# Patient Record
Sex: MALE | Race: WHITE | Employment: OTHER | URBAN - METROPOLITAN AREA
[De-identification: names, ages, dates, MRNs, and addresses within clinical notes are randomized per-mention and may not be internally consistent; named-entity substitution may affect disease eponyms.]

---

## 2019-11-25 ENCOUNTER — HOSPITAL ENCOUNTER (EMERGENCY)
Facility: CLINIC | Age: 29
Discharge: HOME | End: 2019-11-25
Attending: PHYSICIAN ASSISTANT | Admitting: PHYSICIAN ASSISTANT

## 2019-11-25 VITALS
OXYGEN SATURATION: 100 % | SYSTOLIC BLOOD PRESSURE: 150 MMHG | DIASTOLIC BLOOD PRESSURE: 97 MMHG | RESPIRATION RATE: 16 BRPM | HEART RATE: 89 BPM | TEMPERATURE: 98.6 F

## 2019-11-25 DIAGNOSIS — S05.01XA ABRASION OF RIGHT CORNEA, INITIAL ENCOUNTER: ICD-10-CM

## 2019-11-25 PROCEDURE — 99284 EMERGENCY DEPT VISIT MOD MDM: CPT

## 2019-11-25 RX ORDER — ERYTHROMYCIN 5 MG/G
0.5 OINTMENT OPHTHALMIC 4 TIMES DAILY
Qty: 3.5 G | Refills: 0 | Status: SHIPPED | OUTPATIENT
Start: 2019-11-25 | End: 2019-12-02

## 2019-11-25 RX ORDER — KETOROLAC TROMETHAMINE 5 MG/ML
1 SOLUTION OPHTHALMIC 4 TIMES DAILY
Qty: 5 ML | Refills: 0 | Status: SHIPPED | OUTPATIENT
Start: 2019-11-25

## 2019-11-25 ASSESSMENT — ENCOUNTER SYMPTOMS
EYE PAIN: 1
EYE DISCHARGE: 1

## 2019-11-25 NOTE — ED AVS SNAPSHOT
[facility] Emergency Department  [address]  [facility] [address]  Phone:  [phone]  Fax:  [phone]                                    Davon Lackey   MRN: [id number]    Department:  [facility] Emergency Department   Date of Visit:  11/25/2019           After Visit Summary Signature Page    I have received my discharge instructions, and my questions have been answered. I have discussed any challenges I see with this plan with the nurse or doctor.    ..........................................................................................................................................  Patient/Patient Representative Signature      ..........................................................................................................................................  Patient Representative Print Name and Relationship to Patient    ..................................................               ................................................  Date                                   Time    ..........................................................................................................................................  Reviewed by Signature/Title    ...................................................              ..............................................  Date                                               Time          22EPIC Rev 08/18

## 2019-11-26 NOTE — ED TRIAGE NOTES
Pt in with C/O fob to the R eye. Pt reports he was cutting plywood at work approximatly 6 hrs ago and still feels as if there is something in the eye. Redness present on exam. Pt A&Ox4 ABCD's intact

## 2019-11-26 NOTE — ED PROVIDER NOTES
History     Chief Complaint:  Eye pain    HPI   Davon Lackey is a 29 year old male who presents with his mother to the emergency department for evaluation of right eye pain. The patient reports he was cutting plywood this afternoon, while wearing safety glasses, when a valery of wind blew particles into his right eye. He notes most of the particles came out of his eye but he still felt irritation for most of the day. He notes his eye pain worsened around 1700 and he has been experiencing discharge since the incident this afternoon. He denies any vision changes or past eye injuries. He notes his tetanus is up to date, but last recorded tetanus in [facility] is 1997. Patient does not wear contacts.     Allergies:  No known drug allergies     Medications:    The patient is not currently taking any prescribed medications.    Past Medical History:    The patient does not have any past pertinent medical history.    Past Surgical History:    History reviewed. No pertinent surgical history.    Family History:    History reviewed. No pertinent family history.     Social History:  The patient presents to the emergency department with his mother.    Review of Systems   Eyes: Positive for pain and discharge. Negative for visual disturbance.   All other systems reviewed and are negative.    Physical Exam   Patient Vitals for the past 24 hrs:   BP Temp Temp src Pulse Resp SpO2   11/25/19 2051 (!) 150/97 98.6  F (37  C) Temporal 89 16 100 %     Physical Exam  General: Alert and interactive. Appears well.   Head: Atraumatic, without obvious lesion, abrasion, hematoma.   Eyes: The pupils are equal and round. Erythema to the right conjunctivae. Tearing present. No obvious FB. No FB in lids.   ENT: No obvious abnormalities to the ears or nose. Mucous membranes moist.   Neck:Trachea is in the midline. No obvious swelling to the neck. Full range of motion.   CV: Regular rate. Extremities well perfused.  Resp: Non-labored, no retractions or  accessory muscle use.     GI: Abdomen is not distended.   MS: Moving all extremities well.  Neuro: Alert and oriented x 3. Non-focal examination.    Psych: Awake. Alert.  Normal affect. Appropriate interactions.    Emergency Department Course   Procedures:  Slit Lamp Exam:  Visual acuity (R): 20/20, (L): 20/20  Lids WNL  No foreign body noted in detailed exam upper and lower lids/margins  PERRLA, EOMI.  Anterior Chamber: No cells or flare, No hyphema. No hypopyon.   Cornea: No foreign body. Fluorescein staining: Large abrasion at the 10 o'clock position with various other vertical scratches present over the right cornea.    Emergency Department Course:  Past medical records, nursing notes, and vitals reviewed.  2105: I performed an exam of the patient and obtained history, as documented above.     2115: I performed slit lamp exam, as stated above.    2211: I rechecked the patient. Findings and plan explained to the Patient and mother. Patient discharged home with instructions regarding supportive care, medications, and reasons to return. The importance of close follow-up was reviewed.   Impression & Plan    Medical Decision Making:  Davon Lackey is a 29 year old male presents for evaluation of a tender eye. A broad differential diagnosis was considered including bacterial conjunctivitis, viral conjunctivitis, foreign body, corneal abrasion, chemical vs allergic conjunctivitis, corneal ulcer, HSV, herpes zoster opthalmicus, orbital cellulitis, and others. Signs and symptoms consistent with a corneal abrasion. Eye washed out under irrigation with Pranay Lens, given scratches on eye but no signs of FB. Will start antibiotics and have close follow-up of eye physician. No red flag symptoms to suggest any of the other above worrisome etiologies.  No dendrite or ulcer seen on slit lamp exam. Tetanus is up to date.     Diagnosis:    ICD-10-CM   1. Abrasion of right cornea, initial encounter S05.01XA      Disposition:  Discharged to home with instructions for follow up.    Discharge Medications:  Started    erythromycin 5 MG/GM ophthalmic ointment  Commonly known as:  ROMYCIN  0.5 inches, Right Eye, 4 TIMES DAILY     ketorolac 0.5 % ophthalmic solution  Commonly known as:  ACULAR  1 drop, Right Eye, 4 TIMES DAILY     Samantha Mehta  11/25/2019   [facility] EMERGENCY DEPARTMENT  Scribe Disclosure:  I, Samantha Mehta, am serving as a scribe at 9:05 PM on 11/25/2019 to document services personally performed by Maria Luisa Dale PA-C based on my observations and the provider's statements to me.      Maria Luisa Dale PA-C  11/25/19 1067

## (undated) RX ORDER — TETRACAINE HYDROCHLORIDE 5 MG/ML
SOLUTION OPHTHALMIC
Status: DISPENSED
Start: 2019-11-25